# Patient Record
Sex: FEMALE | URBAN - METROPOLITAN AREA
[De-identification: names, ages, dates, MRNs, and addresses within clinical notes are randomized per-mention and may not be internally consistent; named-entity substitution may affect disease eponyms.]

---

## 2018-01-15 ENCOUNTER — APPOINTMENT (RX ONLY)
Dept: URBAN - METROPOLITAN AREA CLINIC 114 | Facility: CLINIC | Age: 73
Setting detail: DERMATOLOGY
End: 2018-01-15

## 2018-01-15 DIAGNOSIS — L21.8 OTHER SEBORRHEIC DERMATITIS: ICD-10-CM

## 2018-01-15 DIAGNOSIS — L29.89 OTHER PRURITUS: ICD-10-CM

## 2018-01-15 DIAGNOSIS — D18.0 HEMANGIOMA: ICD-10-CM

## 2018-01-15 DIAGNOSIS — D22 MELANOCYTIC NEVI: ICD-10-CM

## 2018-01-15 DIAGNOSIS — R20.2 PARESTHESIA OF SKIN: ICD-10-CM

## 2018-01-15 PROBLEM — L29.8 OTHER PRURITUS: Status: ACTIVE | Noted: 2018-01-15

## 2018-01-15 PROBLEM — D22.5 MELANOCYTIC NEVI OF TRUNK: Status: ACTIVE | Noted: 2018-01-15

## 2018-01-15 PROBLEM — D18.01 HEMANGIOMA OF SKIN AND SUBCUTANEOUS TISSUE: Status: ACTIVE | Noted: 2018-01-15

## 2018-01-15 PROCEDURE — ? TREATMENT REGIMEN

## 2018-01-15 PROCEDURE — ? COUNSELING

## 2018-01-15 PROCEDURE — ? PATIENT SPECIFIC COUNSELING

## 2018-01-15 PROCEDURE — 99214 OFFICE O/P EST MOD 30 MIN: CPT

## 2018-01-15 ASSESSMENT — LOCATION DETAILED DESCRIPTION DERM
LOCATION DETAILED: LEFT INFERIOR UPPER BACK
LOCATION DETAILED: LEFT LATERAL SUPERIOR CHEST
LOCATION DETAILED: LEFT SUPERIOR MEDIAL UPPER BACK
LOCATION DETAILED: RIGHT INFERIOR PARIETAL SCALP
LOCATION DETAILED: RIGHT MEDIAL UPPER BACK

## 2018-01-15 ASSESSMENT — LOCATION SIMPLE DESCRIPTION DERM
LOCATION SIMPLE: CHEST
LOCATION SIMPLE: RIGHT UPPER BACK
LOCATION SIMPLE: LEFT UPPER BACK
LOCATION SIMPLE: SCALP

## 2018-01-15 ASSESSMENT — LOCATION ZONE DERM
LOCATION ZONE: SCALP
LOCATION ZONE: TRUNK

## 2018-05-30 ENCOUNTER — APPOINTMENT (RX ONLY)
Dept: URBAN - METROPOLITAN AREA CLINIC 43 | Facility: CLINIC | Age: 73
Setting detail: DERMATOLOGY
End: 2018-05-30

## 2018-05-30 DIAGNOSIS — L72.0 EPIDERMAL CYST: ICD-10-CM

## 2018-05-30 DIAGNOSIS — D22 MELANOCYTIC NEVI: ICD-10-CM

## 2018-05-30 DIAGNOSIS — L82.1 OTHER SEBORRHEIC KERATOSIS: ICD-10-CM

## 2018-05-30 PROBLEM — D22.9 MELANOCYTIC NEVI, UNSPECIFIED: Status: ACTIVE | Noted: 2018-05-30

## 2018-05-30 PROCEDURE — 10040 EXTRACTION: CPT | Mod: NC

## 2018-05-30 PROCEDURE — ? COUNSELING

## 2018-05-30 PROCEDURE — ? ACNE SURGERY

## 2018-05-30 PROCEDURE — 99213 OFFICE O/P EST LOW 20 MIN: CPT | Mod: 25

## 2018-05-30 ASSESSMENT — LOCATION DETAILED DESCRIPTION DERM
LOCATION DETAILED: LEFT NASAL SIDEWALL
LOCATION DETAILED: UPPER STERNUM
LOCATION DETAILED: RIGHT MEDIAL FOREHEAD

## 2018-05-30 ASSESSMENT — LOCATION SIMPLE DESCRIPTION DERM
LOCATION SIMPLE: CHEST
LOCATION SIMPLE: LEFT NOSE
LOCATION SIMPLE: RIGHT FOREHEAD

## 2018-05-30 ASSESSMENT — LOCATION ZONE DERM
LOCATION ZONE: NOSE
LOCATION ZONE: TRUNK
LOCATION ZONE: FACE

## 2018-05-30 NOTE — PROCEDURE: ACNE SURGERY
Render Post-Care Instructions In Note?: yes
Detail Level: Detailed
Post-Care Instructions: I reviewed with the patient in detail post-care instructions. Patient is to keep the treatment areaas dry overnight, and then apply bacitracin twice daily until healed. Patient may apply hydrogen peroxide soaks to remove any crusting.
Prep Text (Optional): Prior to removal the treatment areas were prepped in the usual fashion.
Consent was obtained and risks were reviewed including but not limited to scarring, infection, bleeding, scabbing, incomplete removal, and allergy to anesthesia.
Render Number Of Lesions Treated: no
Extraction Method: 18 gauge needle and comedo extractor
Acne Type: Comedonal Lesions
Hemostasis: Aluminum Chloride

## 2018-12-03 ENCOUNTER — APPOINTMENT (RX ONLY)
Dept: URBAN - METROPOLITAN AREA CLINIC 43 | Facility: CLINIC | Age: 73
Setting detail: DERMATOLOGY
End: 2018-12-03

## 2018-12-03 DIAGNOSIS — L29.89 OTHER PRURITUS: ICD-10-CM

## 2018-12-03 PROBLEM — L29.8 OTHER PRURITUS: Status: ACTIVE | Noted: 2018-12-03

## 2018-12-03 PROBLEM — I10 ESSENTIAL (PRIMARY) HYPERTENSION: Status: ACTIVE | Noted: 2018-12-03

## 2018-12-03 PROCEDURE — ? TREATMENT REGIMEN

## 2018-12-03 PROCEDURE — ? PRESCRIPTION

## 2018-12-03 PROCEDURE — 99213 OFFICE O/P EST LOW 20 MIN: CPT

## 2018-12-03 PROCEDURE — ? COUNSELING

## 2018-12-03 RX ORDER — CLOBETASOL PROPIONATE 0.46 MG/ML
SOLUTION TOPICAL
Qty: 1 | Refills: 2 | Status: ERX | COMMUNITY
Start: 2018-12-03

## 2018-12-03 RX ADMIN — CLOBETASOL PROPIONATE: 0.46 SOLUTION TOPICAL at 00:00

## 2018-12-03 ASSESSMENT — LOCATION SIMPLE DESCRIPTION DERM
LOCATION SIMPLE: RIGHT UPPER BACK
LOCATION SIMPLE: TRAPEZIAL NECK
LOCATION SIMPLE: LEFT SCALP

## 2018-12-03 ASSESSMENT — LOCATION DETAILED DESCRIPTION DERM
LOCATION DETAILED: MID TRAPEZIAL NECK
LOCATION DETAILED: RIGHT MEDIAL UPPER BACK
LOCATION DETAILED: LEFT MEDIAL FRONTAL SCALP

## 2018-12-03 ASSESSMENT — LOCATION ZONE DERM
LOCATION ZONE: TRUNK
LOCATION ZONE: SCALP
LOCATION ZONE: NECK

## 2018-12-03 NOTE — PROCEDURE: TREATMENT REGIMEN
Initiate Treatment: Clobetasol solution apply to aa bid for two weeks break two weeks prn\\nCetaphil restoraderm soaps or dove \\nMoisturize daily with CeraVe anti itch \\nSensitive skin care regimen \\[place] free and clear detergent
Detail Level: Zone
Continue Regimen: Triamcinolone cream apply to aa on the back two times a day for two weeeks

## 2019-01-14 ENCOUNTER — APPOINTMENT (RX ONLY)
Dept: URBAN - METROPOLITAN AREA CLINIC 116 | Facility: CLINIC | Age: 74
Setting detail: DERMATOLOGY
End: 2019-01-14

## 2019-01-14 DIAGNOSIS — L29.89 OTHER PRURITUS: ICD-10-CM | Status: UNCHANGED

## 2019-01-14 PROBLEM — L29.8 OTHER PRURITUS: Status: ACTIVE | Noted: 2019-01-14

## 2019-01-14 PROCEDURE — 99213 OFFICE O/P EST LOW 20 MIN: CPT

## 2019-01-14 PROCEDURE — ? NARROW BAND UVB ORDER

## 2019-01-14 PROCEDURE — ? TREATMENT REGIMEN

## 2019-01-14 PROCEDURE — ? COUNSELING

## 2019-01-14 ASSESSMENT — LOCATION ZONE DERM
LOCATION ZONE: SCALP
LOCATION ZONE: TRUNK
LOCATION ZONE: NECK

## 2019-01-14 ASSESSMENT — LOCATION SIMPLE DESCRIPTION DERM
LOCATION SIMPLE: TRAPEZIAL NECK
LOCATION SIMPLE: LEFT SCALP
LOCATION SIMPLE: LEFT LOWER BACK
LOCATION SIMPLE: RIGHT UPPER BACK

## 2019-01-14 ASSESSMENT — LOCATION DETAILED DESCRIPTION DERM
LOCATION DETAILED: RIGHT MEDIAL UPPER BACK
LOCATION DETAILED: LEFT INFERIOR MEDIAL MIDBACK
LOCATION DETAILED: MID TRAPEZIAL NECK
LOCATION DETAILED: LEFT MEDIAL FRONTAL SCALP

## 2019-01-14 NOTE — PROCEDURE: NARROW BAND UVB ORDER
Dose: 80% MED
Frequency: TIW
Consent: Written consent obtained. The risks were reviewed with the patient including but not limited to: burn, pigmentary changes, pain, blistering, scabbing, redness, increased risk of skin cancers, and the remote possibility of scarring.
Detail Level: Zone
Protocol: [id number]

## 2019-01-14 NOTE — PROCEDURE: TREATMENT REGIMEN
Initiate Treatment: Sensitive skin care regimen
Otc Regimen: CeraVe anti itch lotion \\nSarna lotion
Plan: Discuss light treatment patient unable to due to location/time order placed
Detail Level: Zone
Continue Regimen: Triamcinolone cream apply to aa on the back two times a day for two weeeks\\nClobetasol solution apply to aa bid for two weeks break two weeks prn

## 2022-07-09 ENCOUNTER — TELEPHONE ENCOUNTER (OUTPATIENT)
Dept: URBAN - METROPOLITAN AREA CLINIC 121 | Facility: CLINIC | Age: 77
End: 2022-07-09

## 2022-07-09 RX ORDER — OXYCODONE AND ACETAMINOPHEN 325; 10 MG/1; MG/1
TABLET ORAL
Refills: 0 | OUTPATIENT
Start: 2016-02-11 | End: 2016-04-12

## 2022-07-09 RX ORDER — LOVASTATIN 20 MG/1
TABLET ORAL ONCE A DAY
Refills: 0 | OUTPATIENT
Start: 2016-11-15 | End: 2017-02-14

## 2022-07-09 RX ORDER — OXYCODONE AND ACETAMINOPHEN 325; 10 MG/1; MG/1
TABLET ORAL
Refills: 0 | OUTPATIENT
Start: 2017-09-28 | End: 2019-01-07

## 2022-07-09 RX ORDER — LEVOTHYROXINE SODIUM 125 MCG
TABLET ORAL ONCE A DAY
Refills: 0 | OUTPATIENT
Start: 2016-11-15 | End: 2017-02-14

## 2022-07-09 RX ORDER — LIDOCAINE AND PRILOCAINE 25; 25 MG/G; MG/G
CREAM TOPICAL
Refills: 0 | OUTPATIENT
Start: 2017-02-14 | End: 2017-09-28

## 2022-07-09 RX ORDER — SERTRALINE 50 MG/1
TABLET, FILM COATED ORAL ONCE A DAY
Refills: 0 | OUTPATIENT
Start: 2016-06-09 | End: 2016-07-12

## 2022-07-09 RX ORDER — LOVASTATIN 20 MG/1
TABLET ORAL ONCE A DAY
Refills: 0 | OUTPATIENT
Start: 2015-06-10 | End: 2015-09-29

## 2022-07-09 RX ORDER — LOVASTATIN 20 MG/1
TABLET ORAL ONCE A DAY
Refills: 0 | OUTPATIENT
Start: 2015-12-14 | End: 2016-02-11

## 2022-07-09 RX ORDER — EPOETIN ALFA 3000 [IU]/ML
SOLUTION INTRAVENOUS; SUBCUTANEOUS
Refills: 0 | OUTPATIENT
Start: 2016-02-11 | End: 2016-04-12

## 2022-07-09 RX ORDER — IBUPROFEN AND PSEUDOEPHEDRINE HYDROCHLORIDE 200; 30 MG/1; MG/1
TABLET, COATED ORAL AS NEEDED
Refills: 0 | OUTPATIENT
Start: 2016-07-12 | End: 2016-10-31

## 2022-07-09 RX ORDER — IRON POLYSACCHARIDE COMPLEX 150 MG
CAPSULE ORAL THREE TIMES A DAY
Refills: 0 | OUTPATIENT
Start: 2009-04-01 | End: 2010-06-23

## 2022-07-09 RX ORDER — EPOETIN ALFA 3000 [IU]/ML
SOLUTION INTRAVENOUS; SUBCUTANEOUS
Refills: 0 | OUTPATIENT
Start: 2016-11-15 | End: 2017-02-14

## 2022-07-09 RX ORDER — ROPINIROLE 3 MG/1
TABLET, FILM COATED ORAL
Refills: 0 | OUTPATIENT
Start: 2015-06-10 | End: 2015-09-29

## 2022-07-09 RX ORDER — LORAZEPAM 0.5 MG/1
TABLET ORAL AS NEEDED
Refills: 0 | OUTPATIENT
Start: 2015-05-18 | End: 2015-06-10

## 2022-07-09 RX ORDER — FOLIC ACID 1 MG/1
TABLET ORAL
Refills: 0 | OUTPATIENT
Start: 2012-09-05 | End: 2015-05-18

## 2022-07-09 RX ORDER — CHOLECALCIFEROL (VITAMIN D3) 50 MCG
TABLET ORAL
Refills: 0 | OUTPATIENT
Start: 2012-09-05 | End: 2015-05-18

## 2022-07-09 RX ORDER — CAMPHOR 0.45 %
GEL (GRAM) TOPICAL AS NEEDED
Refills: 0 | OUTPATIENT
Start: 2015-12-14 | End: 2016-02-11

## 2022-07-09 RX ORDER — LOVASTATIN 20 MG/1
TABLET ORAL ONCE A DAY
Refills: 0 | OUTPATIENT
Start: 2017-09-28 | End: 2019-01-07

## 2022-07-09 RX ORDER — LOVASTATIN 20 MG/1
TABLET ORAL ONCE A DAY
Refills: 0 | OUTPATIENT
Start: 2016-06-09 | End: 2016-07-12

## 2022-07-09 RX ORDER — TEMAZEPAM 30 MG/1
CAPSULE ORAL ONCE A DAY
Refills: 0 | OUTPATIENT
Start: 2016-02-11 | End: 2016-04-12

## 2022-07-09 RX ORDER — ASPIRIN 81 MG/1
TABLET, COATED ORAL
Refills: 0 | OUTPATIENT
Start: 2010-06-23 | End: 2012-09-05

## 2022-07-09 RX ORDER — ASPIRIN 81 MG/1
TABLET, DELAYED RELEASE ORAL
Refills: 0 | OUTPATIENT
Start: 2012-09-05 | End: 2014-12-18

## 2022-07-09 RX ORDER — B COMPLEX, C NO.20/FOLIC ACID 1 MG
CAPSULE ORAL ONCE A DAY
Refills: 0 | OUTPATIENT
Start: 2016-06-09 | End: 2016-07-12

## 2022-07-09 RX ORDER — ROPINIROLE 0.5 MG/1
TABLET, FILM COATED ORAL
Refills: 0 | OUTPATIENT
Start: 2012-09-05 | End: 2014-12-18

## 2022-07-09 RX ORDER — TEMAZEPAM 30 MG/1
CAPSULE ORAL ONCE A DAY
Refills: 0 | OUTPATIENT
Start: 2016-11-15 | End: 2017-02-14

## 2022-07-09 RX ORDER — IBUPROFEN AND PSEUDOEPHEDRINE HYDROCHLORIDE 200; 30 MG/1; MG/1
TABLET, COATED ORAL AS NEEDED
Refills: 0 | OUTPATIENT
Start: 2015-09-29 | End: 2015-12-14

## 2022-07-09 RX ORDER — LOVASTATIN 20 MG/1
TABLET ORAL ONCE A DAY
Refills: 0 | OUTPATIENT
Start: 2009-02-25 | End: 2009-04-01

## 2022-07-09 RX ORDER — LEVOTHYROXINE SODIUM 125 MCG
TABLET ORAL ONCE A DAY
Refills: 0 | OUTPATIENT
Start: 2016-07-12 | End: 2016-10-31

## 2022-07-09 RX ORDER — EPOETIN ALFA 3000 [IU]/ML
SOLUTION INTRAVENOUS; SUBCUTANEOUS
Refills: 0 | OUTPATIENT
Start: 2017-09-28 | End: 2019-03-13

## 2022-07-09 RX ORDER — FUROSEMIDE 80 MG/1
TABLET ORAL TWICE A DAY
Refills: 0 | OUTPATIENT
Start: 2016-06-09 | End: 2016-07-12

## 2022-07-09 RX ORDER — TEMAZEPAM 30 MG/1
CAPSULE ORAL ONCE A DAY
Refills: 0 | OUTPATIENT
Start: 2015-09-29 | End: 2015-12-14

## 2022-07-09 RX ORDER — TEMAZEPAM 30 MG/1
CAPSULE ORAL ONCE A DAY
Refills: 0 | OUTPATIENT
Start: 2019-01-07 | End: 2019-03-13

## 2022-07-09 RX ORDER — HEPARIN SODIUM 1000 [USP'U]/ML
INJECTION, SOLUTION INTRAVENOUS; SUBCUTANEOUS
Refills: 0 | OUTPATIENT
Start: 2015-05-18 | End: 2015-06-10

## 2022-07-09 RX ORDER — AMLODIPINE BESYLATE 10 MG/1
TABLET ORAL ONCE A DAY
Refills: 0 | OUTPATIENT
Start: 2009-02-25 | End: 2009-04-01

## 2022-07-09 RX ORDER — TEMAZEPAM 30 MG/1
CAPSULE ORAL ONCE A DAY
Refills: 0 | OUTPATIENT
Start: 2016-07-12 | End: 2016-10-31

## 2022-07-09 RX ORDER — EPOETIN ALFA 3000 [IU]/ML
SOLUTION INTRAVENOUS; SUBCUTANEOUS
Refills: 0 | OUTPATIENT
Start: 2016-07-12 | End: 2016-10-31

## 2022-07-09 RX ORDER — BUPROPION HYDROCHLORIDE 300 MG/1
TABLET, EXTENDED RELEASE ORAL
Refills: 0 | OUTPATIENT
Start: 2010-06-23 | End: 2012-09-05

## 2022-07-09 RX ORDER — SODIUM BICARBONATE 650 MG/1
TABLET ORAL ONCE A DAY
Refills: 0 | OUTPATIENT
Start: 2009-04-01 | End: 2010-06-23

## 2022-07-09 RX ORDER — B COMPLEX, C NO.20/FOLIC ACID 1 MG
CAPSULE ORAL ONCE A DAY
Refills: 0 | OUTPATIENT
Start: 2017-09-28 | End: 2018-12-28

## 2022-07-09 RX ORDER — SERTRALINE 50 MG/1
TABLET, FILM COATED ORAL ONCE A DAY
Refills: 0 | OUTPATIENT
Start: 2017-09-28 | End: 2019-01-07

## 2022-07-09 RX ORDER — HEPARIN SODIUM 1000 [USP'U]/ML
INJECTION, SOLUTION INTRAVENOUS; SUBCUTANEOUS
Refills: 0 | OUTPATIENT
Start: 2016-06-09 | End: 2016-07-12

## 2022-07-09 RX ORDER — B COMPLEX, C NO.20/FOLIC ACID 1 MG
CAPSULE ORAL ONCE A DAY
Refills: 0 | OUTPATIENT
Start: 2015-06-10 | End: 2015-09-29

## 2022-07-09 RX ORDER — OXYCODONE AND ACETAMINOPHEN 325; 10 MG/1; MG/1
TABLET ORAL
Refills: 0 | OUTPATIENT
Start: 2016-04-12 | End: 2016-06-09

## 2022-07-09 RX ORDER — SOLIFENACIN SUCCINATE 10 MG/1
TABLET, FILM COATED ORAL
Refills: 0 | OUTPATIENT
Start: 2012-09-05 | End: 2015-05-18

## 2022-07-09 RX ORDER — SERTRALINE 50 MG/1
TABLET, FILM COATED ORAL ONCE A DAY
Refills: 0 | OUTPATIENT
Start: 2017-02-14 | End: 2017-09-28

## 2022-07-09 RX ORDER — EPOETIN ALFA 3000 [IU]/ML
SOLUTION INTRAVENOUS; SUBCUTANEOUS
Refills: 0 | OUTPATIENT
Start: 2016-06-09 | End: 2016-07-12

## 2022-07-09 RX ORDER — LEVOTHYROXINE SODIUM 125 MCG
TABLET ORAL ONCE A DAY
Refills: 0 | OUTPATIENT
Start: 2015-12-14 | End: 2016-02-11

## 2022-07-09 RX ORDER — LOVASTATIN 20 MG/1
TABLET ORAL
Refills: 0 | OUTPATIENT
Start: 2012-09-05 | End: 2015-05-18

## 2022-07-09 RX ORDER — EPOETIN ALFA 3000 [IU]/ML
SOLUTION INTRAVENOUS; SUBCUTANEOUS
Refills: 0 | OUTPATIENT
Start: 2015-05-18 | End: 2015-06-10

## 2022-07-09 RX ORDER — CAMPHOR 0.45 %
GEL (GRAM) TOPICAL AS NEEDED
Refills: 0 | OUTPATIENT
Start: 2016-06-09 | End: 2016-07-12

## 2022-07-09 RX ORDER — ALBUTEROL SULFATE 90 UG/1
AEROSOL, METERED RESPIRATORY (INHALATION)
Refills: 0 | OUTPATIENT
Start: 2016-10-31 | End: 2016-11-15

## 2022-07-09 RX ORDER — EPOETIN ALFA 3000 [IU]/ML
SOLUTION INTRAVENOUS; SUBCUTANEOUS
Refills: 0 | OUTPATIENT
Start: 2015-09-29 | End: 2015-12-14

## 2022-07-09 RX ORDER — B COMPLEX, C NO.20/FOLIC ACID 1 MG
CAPSULE ORAL ONCE A DAY
Refills: 0 | OUTPATIENT
Start: 2016-02-11 | End: 2016-04-12

## 2022-07-09 RX ORDER — ASPIRIN 81 MG/1
TABLET, COATED ORAL
Refills: 0 | OUTPATIENT
Start: 2010-05-17 | End: 2010-06-23

## 2022-07-09 RX ORDER — B COMPLEX, C NO.20/FOLIC ACID 1 MG
CAPSULE ORAL ONCE A DAY
Refills: 0 | OUTPATIENT
Start: 2017-02-14 | End: 2017-09-28

## 2022-07-09 RX ORDER — B COMPLEX, C NO.20/FOLIC ACID 1 MG
CAPSULE ORAL ONCE A DAY
Refills: 0 | OUTPATIENT
Start: 2016-11-15 | End: 2017-02-14

## 2022-07-09 RX ORDER — LEVOTHYROXINE SODIUM 125 MCG
TABLET ORAL ONCE A DAY
Refills: 0 | OUTPATIENT
Start: 2015-09-29 | End: 2015-12-14

## 2022-07-09 RX ORDER — FUROSEMIDE 80 MG/1
TABLET ORAL TWICE A DAY
Refills: 0 | OUTPATIENT
Start: 2016-10-31 | End: 2016-11-15

## 2022-07-09 RX ORDER — LORAZEPAM 0.5 MG/1
TABLET ORAL AS NEEDED
Refills: 0 | OUTPATIENT
Start: 2016-06-09 | End: 2016-07-12

## 2022-07-09 RX ORDER — LEVOTHYROXINE SODIUM 125 MCG
TABLET ORAL ONCE A DAY
Refills: 0 | OUTPATIENT
Start: 2017-02-14 | End: 2017-09-28

## 2022-07-09 RX ORDER — CAMPHOR 0.45 %
GEL (GRAM) TOPICAL AS NEEDED
Refills: 0 | OUTPATIENT
Start: 2016-02-11 | End: 2016-04-12

## 2022-07-09 RX ORDER — SERTRALINE 50 MG/1
TABLET, FILM COATED ORAL ONCE A DAY
Refills: 0 | OUTPATIENT
Start: 2015-05-18 | End: 2015-06-10

## 2022-07-09 RX ORDER — OXYCODONE AND ACETAMINOPHEN 325; 10 MG/1; MG/1
TABLET ORAL
Refills: 0 | OUTPATIENT
Start: 2016-10-31 | End: 2016-11-15

## 2022-07-09 RX ORDER — PREDNISONE 5 MG/1
TABLET ORAL ONCE A DAY
Refills: 0 | OUTPATIENT
Start: 2016-04-12 | End: 2016-06-09

## 2022-07-09 RX ORDER — LEVOTHYROXINE SODIUM 125 MCG
TABLET ORAL ONCE A DAY
Refills: 0 | OUTPATIENT
Start: 2016-10-31 | End: 2016-11-15

## 2022-07-09 RX ORDER — AMLODIPINE BESYLATE 10 MG/1
TABLET ORAL ONCE A DAY
Refills: 0 | OUTPATIENT
Start: 2009-04-01 | End: 2010-06-23

## 2022-07-09 RX ORDER — OXYCODONE AND ACETAMINOPHEN 325; 10 MG/1; MG/1
TABLET ORAL AS NEEDED
Refills: 0 | OUTPATIENT
Start: 2014-12-18 | End: 2015-05-18

## 2022-07-09 RX ORDER — LEVOTHYROXINE SODIUM 200 MCG
TABLET ORAL
Refills: 0 | OUTPATIENT
Start: 2014-12-18 | End: 2015-05-18

## 2022-07-09 RX ORDER — PREDNISONE 5 MG/1
TABLET ORAL ONCE A DAY
Refills: 0 | OUTPATIENT
Start: 2016-06-09 | End: 2016-07-12

## 2022-07-09 RX ORDER — FLUOXETINE HYDROCHLORIDE 40 MG/1
CAPSULE ORAL ONCE A DAY
Refills: 0 | OUTPATIENT
Start: 2009-04-01 | End: 2010-06-23

## 2022-07-09 RX ORDER — ROPINIROLE 3 MG/1
TABLET, FILM COATED ORAL
Refills: 0 | OUTPATIENT
Start: 2016-06-09 | End: 2016-07-12

## 2022-07-09 RX ORDER — LEVOTHYROXINE SODIUM 125 MCG
TABLET ORAL ONCE A DAY
Refills: 0 | OUTPATIENT
Start: 2019-01-07 | End: 2019-03-13

## 2022-07-09 RX ORDER — PREDNISONE 5 MG/1
TABLET ORAL ONCE A DAY
Refills: 0 | OUTPATIENT
Start: 2015-05-18 | End: 2015-06-10

## 2022-07-09 RX ORDER — LORAZEPAM 0.5 MG/1
TABLET ORAL AS NEEDED
Refills: 0 | OUTPATIENT
Start: 2017-09-28 | End: 2019-01-07

## 2022-07-09 RX ORDER — CLONAZEPAM 2 MG/1
TABLET ORAL
Refills: 0 | OUTPATIENT
Start: 2009-03-18 | End: 2010-06-23

## 2022-07-09 RX ORDER — HEPARIN SODIUM 1000 [USP'U]/ML
INJECTION, SOLUTION INTRAVENOUS; SUBCUTANEOUS
Refills: 0 | OUTPATIENT
Start: 2015-12-14 | End: 2016-02-11

## 2022-07-09 RX ORDER — LOVASTATIN 20 MG/1
TABLET ORAL ONCE A DAY
Refills: 0 | OUTPATIENT
Start: 2016-02-11 | End: 2016-04-12

## 2022-07-09 RX ORDER — ROPINIROLE 0.5 MG/1
TABLET, FILM COATED ORAL
Refills: 0 | OUTPATIENT
Start: 2015-05-18 | End: 2015-06-10

## 2022-07-09 RX ORDER — B COMPLEX, C NO.20/FOLIC ACID 1 MG
CAPSULE ORAL ONCE A DAY
Refills: 0 | OUTPATIENT
Start: 2016-04-12 | End: 2016-06-09

## 2022-07-09 RX ORDER — LEVOTHYROXINE SODIUM 125 MCG
TABLET ORAL ONCE A DAY
Refills: 0 | OUTPATIENT
Start: 2016-04-12 | End: 2016-06-09

## 2022-07-09 RX ORDER — PREDNISONE 10 MG/1
TABLET ORAL
Refills: 0 | OUTPATIENT
Start: 2010-06-23 | End: 2012-09-05

## 2022-07-09 RX ORDER — FUROSEMIDE 80 MG/1
TABLET ORAL TWICE A DAY
Refills: 0 | OUTPATIENT
Start: 2015-12-14 | End: 2016-02-11

## 2022-07-09 RX ORDER — METHYLPREDNISOLONE 4 MG/1
TABLET ORAL ONCE A DAY
Refills: 0 | OUTPATIENT
Start: 2009-04-01 | End: 2010-06-23

## 2022-07-09 RX ORDER — LORAZEPAM 0.5 MG/1
TABLET ORAL AS NEEDED
Refills: 0 | OUTPATIENT
Start: 2016-04-12 | End: 2016-06-09

## 2022-07-09 RX ORDER — LIDOCAINE AND PRILOCAINE 25; 25 MG/G; MG/G
CREAM TOPICAL
Refills: 0 | OUTPATIENT
Start: 2016-02-11 | End: 2016-04-12

## 2022-07-09 RX ORDER — LORATADINE 5 MG
MIX 1 CAPFUL IN 8 OUNCES OF FLUID FOR BOWEL REGULARITY TABLET,CHEWABLE ORAL
Refills: 6 | OUTPATIENT
Start: 2010-05-17 | End: 2010-06-23

## 2022-07-09 RX ORDER — OXYCODONE AND ACETAMINOPHEN 325; 10 MG/1; MG/1
TABLET ORAL
Refills: 0 | OUTPATIENT
Start: 2015-06-10 | End: 2015-09-29

## 2022-07-09 RX ORDER — LIDOCAINE AND PRILOCAINE 25; 25 MG/G; MG/G
CREAM TOPICAL
Refills: 0 | OUTPATIENT
Start: 2016-07-12 | End: 2016-10-31

## 2022-07-09 RX ORDER — SERTRALINE 50 MG/1
TABLET, FILM COATED ORAL ONCE A DAY
Refills: 0 | OUTPATIENT
Start: 2015-06-10 | End: 2015-09-29

## 2022-07-09 RX ORDER — GABAPENTIN 100 MG/1
CAPSULE ORAL TWICE A DAY
Refills: 0 | OUTPATIENT
Start: 2016-07-12 | End: 2016-10-31

## 2022-07-09 RX ORDER — IBUPROFEN AND PSEUDOEPHEDRINE HYDROCHLORIDE 200; 30 MG/1; MG/1
TABLET, COATED ORAL AS NEEDED
Refills: 0 | OUTPATIENT
Start: 2017-02-14 | End: 2017-09-28

## 2022-07-09 RX ORDER — LIDOCAINE AND PRILOCAINE 25; 25 MG/G; MG/G
CREAM TOPICAL
Refills: 0 | OUTPATIENT
Start: 2019-01-07 | End: 2019-03-13

## 2022-07-09 RX ORDER — HEPARIN SODIUM 1000 [USP'U]/ML
INJECTION, SOLUTION INTRAVENOUS; SUBCUTANEOUS
Refills: 0 | OUTPATIENT
Start: 2015-09-29 | End: 2015-12-14

## 2022-07-09 RX ORDER — B COMPLEX, C NO.20/FOLIC ACID 1 MG
CAPSULE ORAL ONCE A DAY
Refills: 0 | OUTPATIENT
Start: 2015-12-14 | End: 2016-02-11

## 2022-07-09 RX ORDER — LEVOTHYROXINE SODIUM 125 MCG
TABLET ORAL ONCE A DAY
Refills: 0 | OUTPATIENT
Start: 2015-06-10 | End: 2015-09-29

## 2022-07-09 RX ORDER — TEMAZEPAM 30 MG/1
CAPSULE ORAL ONCE A DAY
Refills: 0 | OUTPATIENT
Start: 2016-04-12 | End: 2016-06-09

## 2022-07-09 RX ORDER — FUROSEMIDE 80 MG/1
TABLET ORAL TWICE A DAY
Refills: 0 | OUTPATIENT
Start: 2016-07-12 | End: 2016-10-31

## 2022-07-09 RX ORDER — METOPROLOL SUCCINATE 25 MG/1
TABLET, EXTENDED RELEASE ORAL
Refills: 0 | OUTPATIENT
Start: 2010-06-23 | End: 2012-09-05

## 2022-07-09 RX ORDER — CAMPHOR 0.45 %
GEL (GRAM) TOPICAL
Refills: 0 | OUTPATIENT
Start: 2015-05-18 | End: 2015-06-10

## 2022-07-09 RX ORDER — OXYCODONE AND ACETAMINOPHEN 325; 10 MG/1; MG/1
TABLET ORAL
Refills: 0 | OUTPATIENT
Start: 2015-12-14 | End: 2016-02-11

## 2022-07-09 RX ORDER — ESOMEPRAZOLE MAGNESIUM 40 MG
TWICE A DAY CAPSULE,DELAYED RELEASE (ENTERIC COATED) ORAL TWICE A DAY
Refills: 2 | OUTPATIENT
Start: 2016-05-27 | End: 2017-02-03

## 2022-07-09 RX ORDER — HEPARIN SODIUM 1000 [USP'U]/ML
INJECTION, SOLUTION INTRAVENOUS; SUBCUTANEOUS
Refills: 0 | OUTPATIENT
Start: 2016-02-11 | End: 2016-04-12

## 2022-07-09 RX ORDER — LORAZEPAM 0.5 MG/1
TABLET ORAL AS NEEDED
Refills: 0 | OUTPATIENT
Start: 2016-02-11 | End: 2016-04-12

## 2022-07-09 RX ORDER — TEMAZEPAM 30 MG/1
CAPSULE ORAL ONCE A DAY
Refills: 0 | OUTPATIENT
Start: 2015-06-10 | End: 2015-09-29

## 2022-07-09 RX ORDER — LORAZEPAM 0.5 MG/1
TABLET ORAL AS NEEDED
Refills: 0 | OUTPATIENT
Start: 2017-02-14 | End: 2017-09-28

## 2022-07-09 RX ORDER — ESOMEPRAZOLE MAGNESIUM 40 MG
CAPSULE,DELAYED RELEASE (ENTERIC COATED) ORAL
Refills: 0 | OUTPATIENT
Start: 2010-06-23 | End: 2012-09-05

## 2022-07-09 RX ORDER — FUROSEMIDE 80 MG/1
TABLET ORAL TWICE A DAY
Refills: 0 | OUTPATIENT
Start: 2017-02-14 | End: 2017-09-28

## 2022-07-09 RX ORDER — FUROSEMIDE 80 MG/1
TABLET ORAL TWICE A DAY
Refills: 0 | OUTPATIENT
Start: 2016-11-15 | End: 2017-02-14

## 2022-07-09 RX ORDER — EPOETIN ALFA 3000 [IU]/ML
SOLUTION INTRAVENOUS; SUBCUTANEOUS
Refills: 0 | OUTPATIENT
Start: 2016-04-12 | End: 2016-06-09

## 2022-07-09 RX ORDER — ISOSORBIDE MONONITRATE 30 MG/1
TABLET, EXTENDED RELEASE ORAL
Refills: 0 | OUTPATIENT
Start: 2012-09-05 | End: 2015-05-18

## 2022-07-09 RX ORDER — LORAZEPAM 0.5 MG/1
TABLET ORAL AS NEEDED
Refills: 0 | OUTPATIENT
Start: 2016-11-15 | End: 2017-02-14

## 2022-07-09 RX ORDER — FUROSEMIDE 80 MG/1
TABLET ORAL TWICE A DAY
Refills: 0 | OUTPATIENT
Start: 2017-09-28 | End: 2019-01-07

## 2022-07-09 RX ORDER — LEVOTHYROXINE SODIUM 125 MCG
TABLET ORAL ONCE A DAY
Refills: 0 | OUTPATIENT
Start: 2016-02-11 | End: 2016-04-12

## 2022-07-09 RX ORDER — PREDNISONE 5 MG/1
TABLET ORAL ONCE A DAY
Refills: 0 | OUTPATIENT
Start: 2015-06-10 | End: 2015-09-29

## 2022-07-09 RX ORDER — ESOMEPRAZOLE MAGNESIUM 40 MG
TWICE A DAY CAPSULE,DELAYED RELEASE (ENTERIC COATED) ORAL TWICE A DAY
Refills: 1 | OUTPATIENT
Start: 2015-06-10 | End: 2016-05-23

## 2022-07-09 RX ORDER — ESOMEPRAZOLE MAGNESIUM 40 MG
CAPSULE,DELAYED RELEASE (ENTERIC COATED) ORAL ONCE A DAY
Refills: 0 | OUTPATIENT
Start: 2015-06-10 | End: 2015-09-29

## 2022-07-09 RX ORDER — LOVASTATIN 20 MG/1
TABLET ORAL ONCE A DAY
Refills: 0 | OUTPATIENT
Start: 2015-09-29 | End: 2015-12-14

## 2022-07-09 RX ORDER — OXYCODONE AND ACETAMINOPHEN 325; 10 MG/1; MG/1
TABLET ORAL
Refills: 0 | OUTPATIENT
Start: 2016-06-09 | End: 2016-07-12

## 2022-07-09 RX ORDER — EPOETIN ALFA 3000 [IU]/ML
SOLUTION INTRAVENOUS; SUBCUTANEOUS
Refills: 0 | OUTPATIENT
Start: 2015-06-10 | End: 2015-09-29

## 2022-07-09 RX ORDER — IBUPROFEN AND PSEUDOEPHEDRINE HYDROCHLORIDE 200; 30 MG/1; MG/1
TABLET, COATED ORAL AS NEEDED
Refills: 0 | OUTPATIENT
Start: 2016-06-09 | End: 2016-07-12

## 2022-07-09 RX ORDER — LIDOCAINE AND PRILOCAINE 25; 25 MG/G; MG/G
CREAM TOPICAL
Refills: 0 | OUTPATIENT
Start: 2016-10-31 | End: 2016-11-15

## 2022-07-09 RX ORDER — IRON POLYSACCHARIDE COMPLEX 150 MG
CAPSULE ORAL THREE TIMES A DAY
Refills: 0 | OUTPATIENT
Start: 2009-02-25 | End: 2009-04-01

## 2022-07-09 RX ORDER — SOLIFENACIN SUCCINATE 5 MG/1
TABLET, FILM COATED ORAL ONCE A DAY
Refills: 0 | OUTPATIENT
Start: 2009-04-01 | End: 2010-06-23

## 2022-07-09 RX ORDER — ROPINIROLE 0.5 MG/1
TABLET, FILM COATED ORAL
Refills: 0 | OUTPATIENT
Start: 2014-12-18 | End: 2015-05-18

## 2022-07-09 RX ORDER — PANTOPRAZOLE SODIUM 40 MG/1
ONCE A DAY TABLET, DELAYED RELEASE ORAL ONCE A DAY
Refills: 1 | OUTPATIENT
Start: 2019-01-02 | End: 2019-12-09

## 2022-07-09 RX ORDER — LOVASTATIN 20 MG/1
TABLET ORAL ONCE A DAY
Refills: 0 | OUTPATIENT
Start: 2016-10-31 | End: 2016-11-15

## 2022-07-09 RX ORDER — TEMAZEPAM 30 MG/1
CAPSULE ORAL ONCE A DAY
Refills: 0 | OUTPATIENT
Start: 2016-06-09 | End: 2016-07-12

## 2022-07-09 RX ORDER — SERTRALINE 50 MG/1
TABLET, FILM COATED ORAL ONCE A DAY
Refills: 0 | OUTPATIENT
Start: 2016-04-12 | End: 2016-06-09

## 2022-07-09 RX ORDER — CALCIUM NO.38/D3/MAG/BORON ASP 500MG/15ML
LIQUID (ML) ORAL
Refills: 0 | OUTPATIENT
Start: 2010-05-17 | End: 2010-06-23

## 2022-07-09 RX ORDER — FUROSEMIDE 80 MG/1
TABLET ORAL TWICE A DAY
Refills: 0 | OUTPATIENT
Start: 2016-04-12 | End: 2016-06-09

## 2022-07-09 RX ORDER — ASPIRIN 325 MG/1
TABLET ORAL ONCE A DAY
Refills: 0 | OUTPATIENT
Start: 2009-04-01 | End: 2010-05-17

## 2022-07-09 RX ORDER — ASPIRIN 325 MG/1
TABLET ORAL ONCE A DAY
Refills: 0 | OUTPATIENT
Start: 2009-02-25 | End: 2009-04-01

## 2022-07-09 RX ORDER — B COMPLEX, C NO.20/FOLIC ACID 1 MG
CAPSULE ORAL ONCE A DAY
Refills: 0 | OUTPATIENT
Start: 2016-07-12 | End: 2016-10-31

## 2022-07-09 RX ORDER — PREDNISONE 10 MG/1
TABLET ORAL
Refills: 0 | OUTPATIENT
Start: 2012-09-05 | End: 2015-05-18

## 2022-07-09 RX ORDER — CAMPHOR 0.45 %
GEL (GRAM) TOPICAL
Refills: 0 | OUTPATIENT
Start: 2012-09-05 | End: 2015-05-18

## 2022-07-09 RX ORDER — SERTRALINE 50 MG/1
TABLET, FILM COATED ORAL ONCE A DAY
Refills: 0 | OUTPATIENT
Start: 2016-10-31 | End: 2016-11-15

## 2022-07-09 RX ORDER — LIDOCAINE AND PRILOCAINE 25; 25 MG/G; MG/G
CREAM TOPICAL
Refills: 0 | OUTPATIENT
Start: 2016-06-09 | End: 2016-07-12

## 2022-07-09 RX ORDER — LIDOCAINE AND PRILOCAINE 25; 25 MG/G; MG/G
CREAM TOPICAL
Refills: 0 | OUTPATIENT
Start: 2016-04-12 | End: 2016-06-09

## 2022-07-09 RX ORDER — TEMAZEPAM 30 MG/1
CAPSULE ORAL
Refills: 0 | OUTPATIENT
Start: 2010-06-23 | End: 2012-09-05

## 2022-07-09 RX ORDER — FUROSEMIDE 80 MG/1
TABLET ORAL TWICE A DAY
Refills: 0 | OUTPATIENT
Start: 2016-02-11 | End: 2016-04-12

## 2022-07-09 RX ORDER — B COMPLEX, C NO.20/FOLIC ACID 1 MG
CAPSULE ORAL ONCE A DAY
Refills: 0 | OUTPATIENT
Start: 2016-10-31 | End: 2016-11-15

## 2022-07-09 RX ORDER — ROPINIROLE 0.5 MG/1
TABLET, FILM COATED ORAL
Refills: 0 | OUTPATIENT
Start: 2015-06-10 | End: 2015-09-29

## 2022-07-09 RX ORDER — SODIUM BICARBONATE 650 MG/1
TABLET ORAL
Refills: 0 | OUTPATIENT
Start: 2010-06-23 | End: 2012-09-05

## 2022-07-09 RX ORDER — LEVOTHYROXINE SODIUM 125 MCG
TABLET ORAL ONCE A DAY
Refills: 0 | OUTPATIENT
Start: 2015-05-18 | End: 2015-06-10

## 2022-07-09 RX ORDER — OXYCODONE AND ACETAMINOPHEN 325; 10 MG/1; MG/1
TABLET ORAL
Refills: 0 | OUTPATIENT
Start: 2017-02-14 | End: 2017-09-28

## 2022-07-09 RX ORDER — LIDOCAINE AND PRILOCAINE 25; 25 MG/G; MG/G
CREAM TOPICAL
Refills: 0 | OUTPATIENT
Start: 2015-12-14 | End: 2016-02-11

## 2022-07-09 RX ORDER — ASPIRIN 325 MG/1
TABLET ORAL ONCE A DAY
Refills: 0 | OUTPATIENT
Start: 2015-09-29 | End: 2015-09-29

## 2022-07-09 RX ORDER — AMLODIPINE BESYLATE 10 MG/1
TABLET ORAL
Refills: 0 | OUTPATIENT
Start: 2010-06-23 | End: 2012-09-05

## 2022-07-09 RX ORDER — IBUPROFEN AND PSEUDOEPHEDRINE HYDROCHLORIDE 200; 30 MG/1; MG/1
TABLET, COATED ORAL AS NEEDED
Refills: 0 | OUTPATIENT
Start: 2016-11-15 | End: 2017-02-14

## 2022-07-09 RX ORDER — ESOMEPRAZOLE MAGNESIUM 40 MG
CAPSULE,DELAYED RELEASE (ENTERIC COATED) ORAL ONCE A DAY
Refills: 0 | OUTPATIENT
Start: 2015-05-18 | End: 2015-06-10

## 2022-07-09 RX ORDER — ROPINIROLE 3 MG/1
TABLET, FILM COATED ORAL
Refills: 0 | OUTPATIENT
Start: 2015-05-18 | End: 2015-06-10

## 2022-07-09 RX ORDER — TEMAZEPAM 30 MG/1
CAPSULE ORAL ONCE A DAY
Refills: 0 | OUTPATIENT
Start: 2015-05-18 | End: 2015-06-10

## 2022-07-09 RX ORDER — FLUOXETINE HYDROCHLORIDE 40 MG/1
CAPSULE ORAL ONCE A DAY
Refills: 0 | OUTPATIENT
Start: 2009-02-25 | End: 2009-04-01

## 2022-07-09 RX ORDER — LEVOTHYROXINE SODIUM 125 MCG
TABLET ORAL ONCE A DAY
Refills: 0 | OUTPATIENT
Start: 2017-09-28 | End: 2019-01-07

## 2022-07-09 RX ORDER — OXYCODONE AND ACETAMINOPHEN 325; 10 MG/1; MG/1
TABLET ORAL
Refills: 0 | OUTPATIENT
Start: 2015-05-18 | End: 2015-06-10

## 2022-07-09 RX ORDER — EPOETIN ALFA 3000 [IU]/ML
SOLUTION INTRAVENOUS; SUBCUTANEOUS
Refills: 0 | OUTPATIENT
Start: 2017-02-14 | End: 2017-09-28

## 2022-07-09 RX ORDER — LORAZEPAM 0.5 MG/1
TABLET ORAL AS NEEDED
Refills: 0 | OUTPATIENT
Start: 2016-07-12 | End: 2016-10-31

## 2022-07-09 RX ORDER — METHYLPREDNISOLONE 4 MG/1
TABLET ORAL ONCE A DAY
Refills: 0 | OUTPATIENT
Start: 2009-02-25 | End: 2009-04-01

## 2022-07-09 RX ORDER — OXYCODONE AND ACETAMINOPHEN 325; 10 MG/1; MG/1
TABLET ORAL
Refills: 0 | OUTPATIENT
Start: 2015-09-29 | End: 2015-12-14

## 2022-07-09 RX ORDER — IBUPROFEN AND PSEUDOEPHEDRINE HYDROCHLORIDE 200; 30 MG/1; MG/1
TABLET, COATED ORAL AS NEEDED
Refills: 0 | OUTPATIENT
Start: 2015-06-10 | End: 2015-09-29

## 2022-07-09 RX ORDER — LIDOCAINE AND PRILOCAINE 25; 25 MG/G; MG/G
CREAM TOPICAL
Refills: 0 | OUTPATIENT
Start: 2017-09-28 | End: 2019-01-07

## 2022-07-09 RX ORDER — LOVASTATIN 20 MG/1
TABLET ORAL ONCE A DAY
Refills: 0 | OUTPATIENT
Start: 2015-05-18 | End: 2015-06-10

## 2022-07-09 RX ORDER — ASPIRIN 325 MG/1
TABLET ORAL ONCE A DAY
Refills: 0 | OUTPATIENT
Start: 2015-06-10 | End: 2015-09-29

## 2022-07-09 RX ORDER — SERTRALINE 50 MG/1
TABLET, FILM COATED ORAL ONCE A DAY
Refills: 0 | OUTPATIENT
Start: 2016-02-11 | End: 2016-04-12

## 2022-07-09 RX ORDER — LIDOCAINE AND PRILOCAINE 25; 25 MG/G; MG/G
CREAM TOPICAL TAKE AS DIRECTED
Refills: 0 | OUTPATIENT
Start: 2015-06-10 | End: 2015-09-29

## 2022-07-09 RX ORDER — GABAPENTIN 100 MG/1
CAPSULE ORAL TWICE A DAY
Refills: 0 | OUTPATIENT
Start: 2016-10-31 | End: 2016-11-15

## 2022-07-09 RX ORDER — ESOMEPRAZOLE MAGNESIUM 40 MG
CAPSULE,DELAYED RELEASE (ENTERIC COATED) ORAL
Refills: 0 | OUTPATIENT
Start: 2012-09-05 | End: 2014-12-18

## 2022-07-09 RX ORDER — PREDNISONE 5 MG/1
TABLET ORAL ONCE A DAY
Refills: 0 | OUTPATIENT
Start: 2015-12-14 | End: 2016-02-11

## 2022-07-09 RX ORDER — IBUPROFEN AND PSEUDOEPHEDRINE HYDROCHLORIDE 200; 30 MG/1; MG/1
TABLET, COATED ORAL AS NEEDED
Refills: 0 | OUTPATIENT
Start: 2016-04-12 | End: 2016-06-09

## 2022-07-09 RX ORDER — LORAZEPAM 0.5 MG/1
TABLET ORAL AS NEEDED
Refills: 0 | OUTPATIENT
Start: 2015-09-29 | End: 2015-12-14

## 2022-07-09 RX ORDER — LORAZEPAM 0.5 MG/1
TABLET ORAL AS NEEDED
Refills: 0 | OUTPATIENT
Start: 2019-01-07 | End: 2019-03-13

## 2022-07-09 RX ORDER — FUROSEMIDE 40 MG/1
TABLET ORAL
Refills: 0 | OUTPATIENT
Start: 2012-09-05 | End: 2015-05-18

## 2022-07-09 RX ORDER — LEVOTHYROXINE SODIUM 200 MCG
TABLET ORAL
Refills: 0 | OUTPATIENT
Start: 2012-09-05 | End: 2014-12-18

## 2022-07-09 RX ORDER — LOVASTATIN 20 MG/1
TABLET ORAL ONCE A DAY
Refills: 0 | OUTPATIENT
Start: 2017-02-14 | End: 2017-09-28

## 2022-07-09 RX ORDER — TEMAZEPAM 30 MG/1
CAPSULE ORAL ONCE A DAY
Refills: 0 | OUTPATIENT
Start: 2016-10-31 | End: 2016-11-15

## 2022-07-09 RX ORDER — EPOETIN ALFA 3000 [IU]/ML
SOLUTION INTRAVENOUS; SUBCUTANEOUS
Refills: 0 | OUTPATIENT
Start: 2016-10-31 | End: 2016-11-15

## 2022-07-09 RX ORDER — CHOLECALCIFEROL (VITAMIN D3) 50 MCG
TABLET ORAL
Refills: 0 | OUTPATIENT
Start: 2010-06-23 | End: 2012-09-05

## 2022-07-09 RX ORDER — SERTRALINE 50 MG/1
TABLET, FILM COATED ORAL ONCE A DAY
Refills: 0 | OUTPATIENT
Start: 2019-01-07 | End: 2019-03-13

## 2022-07-09 RX ORDER — B COMPLEX, C NO.20/FOLIC ACID 1 MG
CAPSULE ORAL ONCE A DAY
Refills: 0 | OUTPATIENT
Start: 2015-09-29 | End: 2015-12-14

## 2022-07-09 RX ORDER — TOLTERODINE TARTRATE 4 MG/1
CAPSULE, EXTENDED RELEASE ORAL
Refills: 0 | OUTPATIENT
Start: 2010-06-23 | End: 2012-09-05

## 2022-07-09 RX ORDER — LIDOCAINE AND PRILOCAINE 25; 25 MG/G; MG/G
CREAM TOPICAL
Refills: 0 | OUTPATIENT
Start: 2015-09-29 | End: 2015-12-14

## 2022-07-09 RX ORDER — ESOMEPRAZOLE MAGNESIUM 40 MG
CAPSULE,DELAYED RELEASE (ENTERIC COATED) ORAL
Refills: 0 | OUTPATIENT
Start: 2014-12-18 | End: 2015-05-18

## 2022-07-09 RX ORDER — AMLODIPINE BESYLATE 10 MG/1
TABLET ORAL
Refills: 0 | OUTPATIENT
Start: 2014-12-18 | End: 2015-05-18

## 2022-07-09 RX ORDER — B COMPLEX, C NO.20/FOLIC ACID 1 MG
CAPSULE ORAL ONCE A DAY
Refills: 0 | OUTPATIENT
Start: 2015-05-18 | End: 2015-06-10

## 2022-07-09 RX ORDER — CAMPHOR 0.45 %
GEL (GRAM) TOPICAL AS NEEDED
Refills: 0 | OUTPATIENT
Start: 2015-09-29 | End: 2015-12-14

## 2022-07-09 RX ORDER — FUROSEMIDE 80 MG/1
TABLET ORAL TWICE A DAY
Refills: 0 | OUTPATIENT
Start: 2019-01-07 | End: 2019-03-13

## 2022-07-09 RX ORDER — ALBUTEROL SULFATE 90 UG/1
AEROSOL, METERED RESPIRATORY (INHALATION)
Refills: 0 | OUTPATIENT
Start: 2016-07-12 | End: 2016-10-31

## 2022-07-09 RX ORDER — LOVASTATIN 20 MG/1
TABLET ORAL ONCE A DAY
Refills: 0 | OUTPATIENT
Start: 2009-04-01 | End: 2010-06-23

## 2022-07-09 RX ORDER — LORAZEPAM 0.5 MG/1
TABLET ORAL AS NEEDED
Refills: 0 | OUTPATIENT
Start: 2015-12-14 | End: 2016-02-11

## 2022-07-09 RX ORDER — ASPIRIN 81 MG/1
TABLET, DELAYED RELEASE ORAL
Refills: 0 | OUTPATIENT
Start: 2014-12-18 | End: 2015-05-18

## 2022-07-09 RX ORDER — TEMAZEPAM 30 MG/1
CAPSULE ORAL ONCE A DAY
Refills: 0 | OUTPATIENT
Start: 2017-09-28 | End: 2019-01-07

## 2022-07-09 RX ORDER — OXYCODONE AND ACETAMINOPHEN 325; 10 MG/1; MG/1
TABLET ORAL
Refills: 0 | OUTPATIENT
Start: 2016-07-12 | End: 2016-10-31

## 2022-07-09 RX ORDER — FUROSEMIDE 80 MG/1
TABLET ORAL TWICE A DAY
Refills: 0 | OUTPATIENT
Start: 2015-09-29 | End: 2015-12-14

## 2022-07-09 RX ORDER — ERYTHROPOIETIN 10000 [IU]/ML
INJECTION, SOLUTION INTRAVENOUS; SUBCUTANEOUS
Refills: 0 | OUTPATIENT
Start: 2010-06-23 | End: 2012-09-05

## 2022-07-09 RX ORDER — OXYCODONE AND ACETAMINOPHEN 325; 10 MG/1; MG/1
TABLET ORAL
Refills: 0 | OUTPATIENT
Start: 2019-01-07 | End: 2019-03-13

## 2022-07-09 RX ORDER — CAMPHOR 0.45 %
GEL (GRAM) TOPICAL AS NEEDED
Refills: 0 | OUTPATIENT
Start: 2016-04-12 | End: 2016-06-09

## 2022-07-09 RX ORDER — IBUPROFEN AND PSEUDOEPHEDRINE HYDROCHLORIDE 200; 30 MG/1; MG/1
TABLET, COATED ORAL AS NEEDED
Refills: 0 | OUTPATIENT
Start: 2017-09-28 | End: 2018-09-05

## 2022-07-09 RX ORDER — CAMPHOR 0.45 %
GEL (GRAM) TOPICAL
Refills: 0 | OUTPATIENT
Start: 2015-06-10 | End: 2015-09-29

## 2022-07-09 RX ORDER — METOPROLOL SUCCINATE 50 MG/1
TABLET, EXTENDED RELEASE ORAL ONCE A DAY
Refills: 0 | OUTPATIENT
Start: 2017-09-28 | End: 2017-09-28

## 2022-07-09 RX ORDER — FLUOXETINE HYDROCHLORIDE 40 MG/1
CAPSULE ORAL
Refills: 0 | OUTPATIENT
Start: 2010-06-23 | End: 2012-09-05

## 2022-07-09 RX ORDER — TEMAZEPAM 30 MG/1
CAPSULE ORAL ONCE A DAY
Refills: 0 | OUTPATIENT
Start: 2015-12-14 | End: 2016-02-11

## 2022-07-09 RX ORDER — SERTRALINE 50 MG/1
TABLET, FILM COATED ORAL ONCE A DAY
Refills: 0 | OUTPATIENT
Start: 2016-07-12 | End: 2016-10-31

## 2022-07-09 RX ORDER — SERTRALINE 50 MG/1
TABLET, FILM COATED ORAL ONCE A DAY
Refills: 0 | OUTPATIENT
Start: 2015-09-29 | End: 2015-12-14

## 2022-07-09 RX ORDER — LOVASTATIN 20 MG/1
TABLET ORAL ONCE A DAY
Refills: 0 | OUTPATIENT
Start: 2016-07-12 | End: 2016-10-31

## 2022-07-09 RX ORDER — ROPINIROLE 3 MG/1
TABLET, FILM COATED ORAL
Refills: 0 | OUTPATIENT
Start: 2015-09-29 | End: 2015-12-14

## 2022-07-09 RX ORDER — FOLIC ACID 1 MG/1
TABLET ORAL ONCE A DAY
Refills: 0 | OUTPATIENT
Start: 2009-02-25 | End: 2009-04-01

## 2022-07-09 RX ORDER — LIDOCAINE AND PRILOCAINE 25; 25 MG/G; MG/G
CREAM TOPICAL
Refills: 0 | OUTPATIENT
Start: 2016-11-15 | End: 2017-02-14

## 2022-07-09 RX ORDER — FLUOXETINE HYDROCHLORIDE 40 MG/1
CAPSULE ORAL
Refills: 0 | OUTPATIENT
Start: 2012-09-05 | End: 2015-05-18

## 2022-07-09 RX ORDER — ESOMEPRAZOLE MAGNESIUM 40 MG
TWICE A DAY CAPSULE,DELAYED RELEASE (ENTERIC COATED) ORAL TWICE A DAY
Refills: 2 | OUTPATIENT
Start: 2016-05-23 | End: 2016-05-27

## 2022-07-09 RX ORDER — PREDNISONE 5 MG/1
TABLET ORAL ONCE A DAY
Refills: 0 | OUTPATIENT
Start: 2015-09-29 | End: 2015-12-14

## 2022-07-09 RX ORDER — ASPIRIN 325 MG/1
TABLET ORAL ONCE A DAY
Refills: 0 | OUTPATIENT
Start: 2015-05-18 | End: 2015-06-10

## 2022-07-09 RX ORDER — LEVOTHYROXINE SODIUM 125 MCG
TABLET ORAL ONCE A DAY
Refills: 0 | OUTPATIENT
Start: 2016-06-09 | End: 2016-07-12

## 2022-07-09 RX ORDER — TEMAZEPAM 30 MG/1
CAPSULE ORAL ONCE A DAY
Refills: 0 | OUTPATIENT
Start: 2009-02-25 | End: 2009-04-01

## 2022-07-09 RX ORDER — HEPARIN SODIUM 1000 [USP'U]/ML
INJECTION, SOLUTION INTRAVENOUS; SUBCUTANEOUS
Refills: 0 | OUTPATIENT
Start: 2015-06-10 | End: 2015-09-29

## 2022-07-09 RX ORDER — B COMPLEX, C NO.20/FOLIC ACID 1 MG
CAPSULE ORAL ONCE A DAY
Refills: 0 | OUTPATIENT
Start: 2018-12-28 | End: 2019-03-13

## 2022-07-09 RX ORDER — LORAZEPAM 0.5 MG/1
TABLET ORAL AS NEEDED
Refills: 0 | OUTPATIENT
Start: 2015-06-10 | End: 2015-09-29

## 2022-07-09 RX ORDER — ROPINIROLE 3 MG/1
TABLET, FILM COATED ORAL
Refills: 0 | OUTPATIENT
Start: 2016-04-12 | End: 2016-06-09

## 2022-07-09 RX ORDER — LOVASTATIN 20 MG/1
TABLET ORAL ONCE A DAY
Refills: 0 | OUTPATIENT
Start: 2019-01-07 | End: 2019-03-13

## 2022-07-09 RX ORDER — CHROMIUM 200 MCG
TABLET ORAL
Refills: 0 | OUTPATIENT
Start: 2010-05-17 | End: 2010-06-23

## 2022-07-09 RX ORDER — SERTRALINE 50 MG/1
TABLET, FILM COATED ORAL ONCE A DAY
Refills: 0 | OUTPATIENT
Start: 2016-11-15 | End: 2017-02-14

## 2022-07-09 RX ORDER — HEPARIN SODIUM 1000 [USP'U]/ML
INJECTION, SOLUTION INTRAVENOUS; SUBCUTANEOUS
Refills: 0 | OUTPATIENT
Start: 2016-04-12 | End: 2016-06-09

## 2022-07-09 RX ORDER — OXYCODONE AND ACETAMINOPHEN 325; 10 MG/1; MG/1
TABLET ORAL
Refills: 0 | OUTPATIENT
Start: 2016-11-15 | End: 2017-02-14

## 2022-07-09 RX ORDER — ROPINIROLE 3 MG/1
TABLET, FILM COATED ORAL
Refills: 0 | OUTPATIENT
Start: 2016-02-11 | End: 2016-04-12

## 2022-07-09 RX ORDER — LORAZEPAM 0.5 MG/1
TABLET ORAL AS NEEDED
Refills: 0 | OUTPATIENT
Start: 2016-10-31 | End: 2016-11-15

## 2022-07-09 RX ORDER — IBUPROFEN AND PSEUDOEPHEDRINE HYDROCHLORIDE 200; 30 MG/1; MG/1
TABLET, COATED ORAL AS NEEDED
Refills: 0 | OUTPATIENT
Start: 2016-02-11 | End: 2016-04-12

## 2022-07-09 RX ORDER — PREDNISONE 5 MG/1
TABLET ORAL ONCE A DAY
Refills: 0 | OUTPATIENT
Start: 2016-02-11 | End: 2016-04-12

## 2022-07-09 RX ORDER — SERTRALINE 50 MG/1
TABLET, FILM COATED ORAL ONCE A DAY
Refills: 0 | OUTPATIENT
Start: 2015-12-14 | End: 2016-02-11

## 2022-07-09 RX ORDER — SOLIFENACIN SUCCINATE 5 MG/1
TABLET, FILM COATED ORAL ONCE A DAY
Refills: 0 | OUTPATIENT
Start: 2009-02-25 | End: 2009-04-01

## 2022-07-09 RX ORDER — IBUPROFEN AND PSEUDOEPHEDRINE HYDROCHLORIDE 200; 30 MG/1; MG/1
TABLET, COATED ORAL AS NEEDED
Refills: 0 | OUTPATIENT
Start: 2015-05-18 | End: 2015-06-10

## 2022-07-09 RX ORDER — FUROSEMIDE 80 MG/1
TABLET ORAL TWICE A DAY
Refills: 0 | OUTPATIENT
Start: 2015-06-10 | End: 2015-09-29

## 2022-07-09 RX ORDER — LEVOTHYROXINE SODIUM 200 MCG
TABLET ORAL
Refills: 0 | OUTPATIENT
Start: 2010-06-23 | End: 2012-09-05

## 2022-07-09 RX ORDER — EPOETIN ALFA 3000 [IU]/ML
SOLUTION INTRAVENOUS; SUBCUTANEOUS
Refills: 0 | OUTPATIENT
Start: 2015-12-14 | End: 2016-02-11

## 2022-07-09 RX ORDER — TEMAZEPAM 30 MG/1
CAPSULE ORAL ONCE A DAY
Refills: 0 | OUTPATIENT
Start: 2009-04-01 | End: 2010-06-23

## 2022-07-09 RX ORDER — FOLIC ACID 1 MG/1
TABLET ORAL
Refills: 0 | OUTPATIENT
Start: 2010-06-23 | End: 2012-09-05

## 2022-07-09 RX ORDER — AMLODIPINE BESYLATE 10 MG/1
TABLET ORAL
Refills: 0 | OUTPATIENT
Start: 2012-09-05 | End: 2014-12-18

## 2022-07-09 RX ORDER — IBUPROFEN AND PSEUDOEPHEDRINE HYDROCHLORIDE 200; 30 MG/1; MG/1
TABLET, COATED ORAL AS NEEDED
Refills: 0 | OUTPATIENT
Start: 2015-12-14 | End: 2016-02-11

## 2022-07-09 RX ORDER — IBUPROFEN AND PSEUDOEPHEDRINE HYDROCHLORIDE 200; 30 MG/1; MG/1
TABLET, COATED ORAL AS NEEDED
Refills: 0 | OUTPATIENT
Start: 2016-10-31 | End: 2016-11-15

## 2022-07-09 RX ORDER — PANTOPRAZOLE SODIUM 40 MG/1
1 TABLET TWICE A DAY TABLET, DELAYED RELEASE ORAL TWICE A DAY
Refills: 1 | OUTPATIENT
Start: 2019-01-17 | End: 2019-03-13

## 2022-07-09 RX ORDER — SODIUM BICARBONATE 650 MG/1
TABLET ORAL ONCE A DAY
Refills: 0 | OUTPATIENT
Start: 2009-02-25 | End: 2009-04-01

## 2022-07-09 RX ORDER — ROPINIROLE 3 MG/1
TABLET, FILM COATED ORAL
Refills: 0 | OUTPATIENT
Start: 2015-12-14 | End: 2016-02-11

## 2022-07-09 RX ORDER — LOVASTATIN 20 MG/1
TABLET ORAL ONCE A DAY
Refills: 0 | OUTPATIENT
Start: 2016-04-12 | End: 2016-06-09

## 2022-07-09 RX ORDER — FOLIC ACID 1 MG/1
TABLET ORAL ONCE A DAY
Refills: 0 | OUTPATIENT
Start: 2009-04-01 | End: 2010-06-23

## 2022-07-09 RX ORDER — TEMAZEPAM 30 MG/1
CAPSULE ORAL ONCE A DAY
Refills: 0 | OUTPATIENT
Start: 2017-02-14 | End: 2017-09-28

## 2022-07-09 RX ORDER — LIDOCAINE AND PRILOCAINE 25; 25 MG/G; MG/G
CREAM TOPICAL TAKE AS DIRECTED
Refills: 0 | OUTPATIENT
Start: 2015-05-18 | End: 2015-06-10

## 2022-07-09 RX ORDER — FUROSEMIDE 80 MG/1
TABLET ORAL TWICE A DAY
Refills: 0 | OUTPATIENT
Start: 2015-05-18 | End: 2015-06-10

## 2022-07-10 ENCOUNTER — TELEPHONE ENCOUNTER (OUTPATIENT)
Dept: URBAN - METROPOLITAN AREA CLINIC 121 | Facility: CLINIC | Age: 77
End: 2022-07-10

## 2022-07-10 RX ORDER — METOPROLOL SUCCINATE 50 MG/1
TABLET, EXTENDED RELEASE ORAL TWICE A DAY
Refills: 0 | Status: ACTIVE | COMMUNITY
Start: 2017-09-28

## 2022-07-10 RX ORDER — NYSTATIN 100000 [USP'U]/ML
6ML QID X 14 DAYS SUSPENSION ORAL
Refills: 0 | Status: ACTIVE | COMMUNITY
Start: 2019-03-13

## 2022-07-10 RX ORDER — RABEPRAZOLE SODIUM 20 MG/1
TWICE A DAY TABLET, DELAYED RELEASE ORAL TWICE A DAY
Refills: 0 | Status: ACTIVE | COMMUNITY
Start: 2019-01-07

## 2022-07-10 RX ORDER — B COMPLEX, C NO.20/FOLIC ACID 1 MG
CAPSULE ORAL ONCE A DAY
Refills: 0 | Status: ACTIVE | COMMUNITY
Start: 2019-03-13

## 2022-07-10 RX ORDER — LOVASTATIN 20 MG/1
TABLET ORAL ONCE A DAY
Refills: 0 | Status: ACTIVE | COMMUNITY
Start: 2019-03-13

## 2022-07-10 RX ORDER — FUROSEMIDE 80 MG/1
TABLET ORAL TWICE A DAY
Refills: 0 | Status: ACTIVE | COMMUNITY
Start: 2019-03-13

## 2022-07-10 RX ORDER — PANTOPRAZOLE SODIUM 40 MG/1
TAKE ONE TABLET BY MOUTH TWICE A DAY TABLET, DELAYED RELEASE ORAL
Refills: 3 | Status: ACTIVE | COMMUNITY
Start: 2019-12-09

## 2022-07-10 RX ORDER — TEMAZEPAM 30 MG/1
CAPSULE ORAL ONCE A DAY
Refills: 0 | Status: ACTIVE | COMMUNITY
Start: 2019-03-13

## 2022-07-10 RX ORDER — HYDROCORTISONE 25 MG/G
APPLY TO THE RECTAL AREA BID X 2 WEEKS CREAM TOPICAL
Refills: 0 | Status: ACTIVE | COMMUNITY
Start: 2012-09-05

## 2022-07-10 RX ORDER — EPOETIN ALFA 3000 [IU]/ML
SOLUTION INTRAVENOUS; SUBCUTANEOUS
Refills: 0 | Status: ACTIVE | COMMUNITY
Start: 2019-03-13

## 2022-07-10 RX ORDER — PANTOPRAZOLE SODIUM 40 MG/1
ONCE A DAY TABLET, DELAYED RELEASE ORAL ONCE A DAY
Refills: 1 | Status: ACTIVE | COMMUNITY
Start: 2017-12-11

## 2022-07-10 RX ORDER — ESOMEPRAZOLE MAGNESIUM 20 MG
TAKE ONE CAPSULE BID CAPSULE,DELAYED RELEASE (ENTERIC COATED) ORAL
Refills: 0 | Status: ACTIVE | COMMUNITY
Start: 2014-12-18

## 2022-07-10 RX ORDER — LEVOTHYROXINE SODIUM 125 MCG
TABLET ORAL ONCE A DAY
Refills: 0 | Status: ACTIVE | COMMUNITY
Start: 2019-03-13

## 2022-07-10 RX ORDER — LIDOCAINE AND PRILOCAINE 25; 25 MG/G; MG/G
CREAM TOPICAL
Refills: 0 | Status: ACTIVE | COMMUNITY
Start: 2019-03-13

## 2022-07-10 RX ORDER — ALBUTEROL SULFATE 90 UG/1
AEROSOL, METERED RESPIRATORY (INHALATION)
Refills: 0 | Status: ACTIVE | COMMUNITY
Start: 2016-11-15

## 2022-07-10 RX ORDER — GABAPENTIN 100 MG/1
CAPSULE ORAL TWICE A DAY
Refills: 0 | Status: ACTIVE | COMMUNITY
Start: 2016-11-15

## 2022-07-10 RX ORDER — LORAZEPAM 0.5 MG/1
TABLET ORAL AS NEEDED
Refills: 0 | Status: ACTIVE | COMMUNITY
Start: 2019-03-13

## 2022-07-10 RX ORDER — PANTOPRAZOLE SODIUM 40 MG/1
ONCE A DAY TABLET, DELAYED RELEASE ORAL ONCE A DAY
Refills: 0 | Status: ACTIVE | COMMUNITY
Start: 2018-05-29

## 2022-07-10 RX ORDER — CIPROFLOXACIN HCL 250 MG
TABLET ORAL TWICE A DAY
Refills: 0 | Status: ACTIVE | COMMUNITY
Start: 2009-06-02

## 2022-07-10 RX ORDER — OXYCODONE AND ACETAMINOPHEN 325; 10 MG/1; MG/1
TABLET ORAL
Refills: 0 | Status: ACTIVE | COMMUNITY
Start: 2019-03-13

## 2022-07-10 RX ORDER — ESOMEPRAZOLE MAGNESIUM 40 MG
TWICE A DAY CAPSULE,DELAYED RELEASE (ENTERIC COATED) ORAL TWICE A DAY
Refills: 2 | Status: ACTIVE | COMMUNITY
Start: 2017-02-03

## 2022-07-10 RX ORDER — SERTRALINE 50 MG/1
TABLET, FILM COATED ORAL ONCE A DAY
Refills: 0 | Status: ACTIVE | COMMUNITY
Start: 2019-03-13